# Patient Record
Sex: FEMALE | Race: WHITE | NOT HISPANIC OR LATINO | URBAN - NONMETROPOLITAN AREA
[De-identification: names, ages, dates, MRNs, and addresses within clinical notes are randomized per-mention and may not be internally consistent; named-entity substitution may affect disease eponyms.]

---

## 2019-06-28 ENCOUNTER — IMPORTED ENCOUNTER (OUTPATIENT)
Dept: URBAN - NONMETROPOLITAN AREA CLINIC 1 | Facility: CLINIC | Age: 64
End: 2019-06-28

## 2019-06-28 PROBLEM — H52.4: Noted: 2019-06-28

## 2019-06-28 PROBLEM — H52.11: Noted: 2019-06-28

## 2019-06-28 PROBLEM — H52.222: Noted: 2019-06-28

## 2019-06-28 PROBLEM — H25.13: Noted: 2019-06-28

## 2019-06-28 PROCEDURE — S0621 ROUTINE OPHTHALMOLOGICAL EXA: HCPCS

## 2019-06-28 PROCEDURE — 92310 CONTACT LENS FITTING OU: CPT

## 2019-06-28 NOTE — PATIENT DISCUSSION
Simple Myopia OD/Simple Astigmatism OS w/Presbyopia-  discussed findings w/patient-  new spectacle/CL Rx issued-  monitor yearly or prn; 's Notes: MR 6/28/2019DFE 6/28/2019

## 2021-04-09 ENCOUNTER — IMPORTED ENCOUNTER (OUTPATIENT)
Dept: URBAN - NONMETROPOLITAN AREA CLINIC 1 | Facility: CLINIC | Age: 66
End: 2021-04-09

## 2021-04-09 PROBLEM — H52.11: Noted: 2021-04-09

## 2021-04-09 PROBLEM — H52.222: Noted: 2021-04-09

## 2021-04-09 PROBLEM — H52.4: Noted: 2021-04-09

## 2021-04-09 PROBLEM — H25.13: Noted: 2021-04-09

## 2021-04-09 PROCEDURE — 92015 DETERMINE REFRACTIVE STATE: CPT

## 2021-04-09 PROCEDURE — 99213 OFFICE O/P EST LOW 20 MIN: CPT

## 2021-04-09 PROCEDURE — 92310 CONTACT LENS FITTING OU: CPT

## 2021-04-09 NOTE — PATIENT DISCUSSION
Cataracts OU-  discussed findings w/patient-  no treatment indicated at this time-  UV protection recommended-  continue to monitor yearly or prnSimple Myopia OD/Simple Astigmatism OS w/Presbyopia-  discussed findings w/patient-  new spectacle/CL Rx issued-  monitor yearly or prn; 's Notes: MR 4/8/2021DFE 6/28/2019

## 2021-04-27 ENCOUNTER — IMPORTED ENCOUNTER (OUTPATIENT)
Dept: URBAN - NONMETROPOLITAN AREA CLINIC 1 | Facility: CLINIC | Age: 66
End: 2021-04-27

## 2021-04-27 NOTE — PATIENT DISCUSSION
CL Check-  discussed findings w/patient-  patient was having trouble seeing w/lenses at home but when she came in today everything was fine-  patient also has a split in OS today-  gave patient a new trial pr and another copy of CL Rx -  RTC 1 year or prn; 's Notes: MR 4/8/2021DFE 6/28/2019

## 2022-04-09 ASSESSMENT — VISUAL ACUITY
OS_SC: 20/25
OU_CC: 20/20
OS_SC: 20/25
OD_SC: 20/20-2
OU_SC: 20/25
OU_SC: 20/20
OD_SC: 20/25
OU_SC: 20/20-2
OD_SC: 20/25
OS_SC: 20/25
OU_SC: 20/20
OD_SC: 20/25+2

## 2022-04-09 ASSESSMENT — TONOMETRY
OS_IOP_MMHG: 15
OD_IOP_MMHG: 15
OD_IOP_MMHG: 14
OS_IOP_MMHG: 15

## 2023-04-10 ENCOUNTER — COMPREHENSIVE EXAM (OUTPATIENT)
Dept: URBAN - NONMETROPOLITAN AREA CLINIC 4 | Facility: CLINIC | Age: 68
End: 2023-04-10

## 2023-04-10 DIAGNOSIS — H25.13: ICD-10-CM

## 2023-04-10 DIAGNOSIS — H52.4: ICD-10-CM

## 2023-04-10 DIAGNOSIS — H52.222: ICD-10-CM

## 2023-04-10 DIAGNOSIS — H52.11: ICD-10-CM

## 2023-04-10 PROCEDURE — 92015 DETERMINE REFRACTIVE STATE: CPT

## 2023-04-10 PROCEDURE — 92014 COMPRE OPH EXAM EST PT 1/>: CPT

## 2023-04-10 PROCEDURE — 92310-E CONTACT LENS FITTING ESTABLISH PATIENT

## 2023-04-10 ASSESSMENT — VISUAL ACUITY
OS_CC: J1
OD_CC: 20/20-2
OS_CC: 20/30-2
OD_CC: J3

## 2023-04-10 ASSESSMENT — TONOMETRY
OD_IOP_MMHG: 16
OS_IOP_MMHG: 15

## 2024-11-22 ENCOUNTER — COMPREHENSIVE EXAM (OUTPATIENT)
Dept: URBAN - NONMETROPOLITAN AREA CLINIC 4 | Facility: CLINIC | Age: 69
End: 2024-11-22

## 2024-11-22 DIAGNOSIS — H52.11: ICD-10-CM

## 2024-11-22 DIAGNOSIS — H52.4: ICD-10-CM

## 2024-11-22 DIAGNOSIS — H52.222: ICD-10-CM

## 2024-11-22 DIAGNOSIS — H25.13: ICD-10-CM

## 2024-11-22 DIAGNOSIS — H16.223: ICD-10-CM

## 2024-11-22 DIAGNOSIS — H43.813: ICD-10-CM

## 2024-11-22 PROCEDURE — 92014 COMPRE OPH EXAM EST PT 1/>: CPT

## 2024-11-22 PROCEDURE — 92015 DETERMINE REFRACTIVE STATE: CPT
